# Patient Record
Sex: FEMALE | URBAN - METROPOLITAN AREA
[De-identification: names, ages, dates, MRNs, and addresses within clinical notes are randomized per-mention and may not be internally consistent; named-entity substitution may affect disease eponyms.]

---

## 2018-05-16 ENCOUNTER — SKIN CANCER EXAM (OUTPATIENT)
Dept: URBAN - METROPOLITAN AREA CLINIC 34 | Facility: CLINIC | Age: 38
Setting detail: DERMATOLOGY
End: 2018-05-16

## 2018-05-16 PROBLEM — R21 RASH AND OTHER NONSPECIFIC SKIN ERUPTION: Status: RESOLVED | Noted: 2018-05-16

## 2018-05-16 PROCEDURE — 99203 OFFICE O/P NEW LOW 30 MIN: CPT

## 2019-04-08 ENCOUNTER — RASH (OUTPATIENT)
Dept: URBAN - METROPOLITAN AREA CLINIC 34 | Facility: CLINIC | Age: 39
Setting detail: DERMATOLOGY
End: 2019-04-08

## 2019-04-08 DIAGNOSIS — L70.0 ACNE VULGARIS: ICD-10-CM

## 2019-04-08 PROCEDURE — 99213 OFFICE O/P EST LOW 20 MIN: CPT

## 2019-04-08 RX ORDER — TRIAMCINOLONE ACETONIDE 1 MG/G
1 APPLICATION CREAM TOPICAL BID
Qty: 45 | Refills: 1
Start: 2019-04-08

## 2021-08-23 ENCOUNTER — OFFICE VISIT (OUTPATIENT)
Dept: URBAN - METROPOLITAN AREA CLINIC 23 | Facility: CLINIC | Age: 41
End: 2021-08-23

## 2022-09-23 ENCOUNTER — DASHBOARD ENCOUNTERS (OUTPATIENT)
Age: 42
End: 2022-09-23

## 2022-09-23 ENCOUNTER — LAB OUTSIDE AN ENCOUNTER (OUTPATIENT)
Dept: URBAN - METROPOLITAN AREA CLINIC 23 | Facility: CLINIC | Age: 42
End: 2022-09-23

## 2022-09-23 ENCOUNTER — OFFICE VISIT (OUTPATIENT)
Dept: URBAN - METROPOLITAN AREA CLINIC 23 | Facility: CLINIC | Age: 42
End: 2022-09-23
Payer: COMMERCIAL

## 2022-09-23 VITALS
SYSTOLIC BLOOD PRESSURE: 112 MMHG | TEMPERATURE: 97.9 F | WEIGHT: 132 LBS | HEIGHT: 64 IN | HEART RATE: 119 BPM | DIASTOLIC BLOOD PRESSURE: 78 MMHG | BODY MASS INDEX: 22.53 KG/M2

## 2022-09-23 DIAGNOSIS — K59.09 CHRONIC CONSTIPATION: ICD-10-CM

## 2022-09-23 DIAGNOSIS — R93.5 ABNORMAL CT OF THE ABDOMEN: ICD-10-CM

## 2022-09-23 DIAGNOSIS — R19.5 MUCUS IN STOOL: ICD-10-CM

## 2022-09-23 DIAGNOSIS — K92.1 BLOOD IN STOOL: ICD-10-CM

## 2022-09-23 PROCEDURE — G9903 PT SCRN TBCO ID AS NON USER: HCPCS | Performed by: INTERNAL MEDICINE

## 2022-09-23 PROCEDURE — 99204 OFFICE O/P NEW MOD 45 MIN: CPT | Performed by: INTERNAL MEDICINE

## 2022-09-23 PROCEDURE — G8427 DOCREV CUR MEDS BY ELIG CLIN: HCPCS | Performed by: INTERNAL MEDICINE

## 2022-09-23 PROCEDURE — G8420 CALC BMI NORM PARAMETERS: HCPCS | Performed by: INTERNAL MEDICINE

## 2022-09-23 PROCEDURE — G9622 NO UNHEAL ETOH USER: HCPCS | Performed by: INTERNAL MEDICINE

## 2022-09-23 PROCEDURE — 3017F COLORECTAL CA SCREEN DOC REV: CPT | Performed by: INTERNAL MEDICINE

## 2022-09-23 PROCEDURE — 1036F TOBACCO NON-USER: CPT | Performed by: INTERNAL MEDICINE

## 2022-09-23 RX ORDER — SODIUM PICOSULFATE, MAGNESIUM OXIDE, AND ANHYDROUS CITRIC ACID 10; 3.5; 12 MG/160ML; G/160ML; G/160ML
160ML LIQUID ORAL AS DIRECTED
Qty: 320 ML | Refills: 0 | OUTPATIENT
Start: 2022-09-23 | End: 2022-09-24

## 2022-09-23 NOTE — PREVIOUS WORKUP REVIEWED
. ENDOSCOPIES  LABS  IMAGES -CT abdomen pelvis with contrast 2/23/2018: Indication of abdominal pain, generalized, nausea vomiting, constipation.  Bowel thickening in the rectum consistent with mild proctitis.  Moderate to severe retained feces.  Bowel thickening in duodenum and incisional loops consistent with proximal enteritis.  No obstruction.  4.6 cm right ovarian cyst.

## 2022-09-23 NOTE — HPI-TODAY'S VISIT:
42-year-old female presents for abnormal CT scan findings in 2018 and vague symptoms including color change of tongue, skin, and feeling fatigue. Back in 2018 she had a CT scan done for the same sxs and N/V, which showed thickening of the bowel in the small intestine and rectum.  No GI work-up was done at that time. She has constipation, moves bowel every 2-3 days.  [place] Stool Scale 1, 5, 6.  Reports increased mucus and blood with the stools.  Nausea sometimes but no vomiting.  Denies abdominal pain.  Denies unintentional weight loss.  No previous EGD or colonoscopy.  H. pylori test was negative, her  had H. pylori infection.

## 2022-09-26 LAB
A/G RATIO: 1.7
ALBUMIN: 4.1
ALKALINE PHOSPHATASE: 53
ALT (SGPT): 8
AST (SGOT): 15
BILIRUBIN, TOTAL: 0.4
BUN/CREATININE RATIO: (no result)
BUN: 12
C-REACTIVE PROTEIN, QUANT: 3.1
CALCIUM: 8.7
CARBON DIOXIDE, TOTAL: 24
CHLORIDE: 106
CREATININE: 0.87
EGFR: 85
GLOBULIN, TOTAL: 2.4
GLUCOSE: 97
HEMATOCRIT: 40
HEMOGLOBIN: 13.4
IMMUNOGLOBULIN A, QN, SERUM: 155
MCH: 32.3
MCHC: 33.5
MCV: 96.4
MPV: 9.7
PLATELET COUNT: 308
POTASSIUM: 3.8
PROTEIN, TOTAL: 6.5
RDW: 11.5
RED BLOOD CELL COUNT: 4.15
SED RATE BY MODIFIED: 2
SODIUM: 138
T-TRANSGLUTAMINASE (TTG) IGA: <1
WHITE BLOOD CELL COUNT: 8.3

## 2022-10-09 LAB — CALPROTECTIN, FECAL: 6

## 2022-10-18 ENCOUNTER — OFFICE VISIT (OUTPATIENT)
Dept: URBAN - METROPOLITAN AREA SURGERY CENTER 8 | Facility: SURGERY CENTER | Age: 42
End: 2022-10-18